# Patient Record
Sex: MALE | Race: WHITE | NOT HISPANIC OR LATINO | Employment: FULL TIME | URBAN - NONMETROPOLITAN AREA
[De-identification: names, ages, dates, MRNs, and addresses within clinical notes are randomized per-mention and may not be internally consistent; named-entity substitution may affect disease eponyms.]

---

## 2019-01-18 ENCOUNTER — APPOINTMENT (OUTPATIENT)
Dept: OCCUPATIONAL MEDICINE | Facility: OTHER | Age: 29
End: 2019-01-18

## 2019-01-18 PROCEDURE — 82075 ASSAY OF BREATH ETHANOL: CPT

## 2019-01-18 PROCEDURE — 99000 SPECIMEN HANDLING OFFICE-LAB: CPT

## 2019-01-18 PROCEDURE — 99199 UNLISTED SPECIAL SVC PX/RPRT: CPT

## 2019-06-12 ENCOUNTER — APPOINTMENT (OUTPATIENT)
Dept: OCCUPATIONAL MEDICINE | Facility: OTHER | Age: 29
End: 2019-06-12

## 2019-06-12 ENCOUNTER — APPOINTMENT (OUTPATIENT)
Dept: CHIROPRACTIC MEDICINE | Facility: OTHER | Age: 29
End: 2019-06-12

## 2019-06-12 PROCEDURE — 97799 UNLISTED PHYSCL MED/REHAB PX: CPT

## 2019-06-12 PROCEDURE — 99000 SPECIMEN HANDLING OFFICE-LAB: CPT

## 2019-06-12 PROCEDURE — 99499 UNLISTED E&M SERVICE: CPT

## 2020-11-16 ENCOUNTER — APPOINTMENT (OUTPATIENT)
Dept: OCCUPATIONAL MEDICINE | Facility: OTHER | Age: 30
End: 2020-11-16

## 2020-11-16 PROCEDURE — 99000 SPECIMEN HANDLING OFFICE-LAB: CPT

## 2022-09-14 ENCOUNTER — OFFICE VISIT (OUTPATIENT)
Dept: FAMILY MEDICINE | Facility: OTHER | Age: 32
End: 2022-09-14
Attending: NURSE PRACTITIONER
Payer: COMMERCIAL

## 2022-09-14 VITALS
OXYGEN SATURATION: 99 % | HEART RATE: 98 BPM | RESPIRATION RATE: 16 BRPM | SYSTOLIC BLOOD PRESSURE: 132 MMHG | WEIGHT: 186.4 LBS | TEMPERATURE: 99.2 F | DIASTOLIC BLOOD PRESSURE: 84 MMHG

## 2022-09-14 DIAGNOSIS — K60.2 RECTAL FISSURE: Primary | ICD-10-CM

## 2022-09-14 PROCEDURE — 99213 OFFICE O/P EST LOW 20 MIN: CPT | Performed by: FAMILY MEDICINE

## 2022-09-14 RX ORDER — MUPIROCIN 20 MG/G
OINTMENT TOPICAL 2 TIMES DAILY
Qty: 30 G | Refills: 1 | Status: SHIPPED | OUTPATIENT
Start: 2022-09-14 | End: 2023-09-21

## 2022-09-14 ASSESSMENT — PAIN SCALES - GENERAL: PAINLEVEL: NO PAIN (0)

## 2022-09-14 NOTE — NURSING NOTE
Chief Complaint   Patient presents with     Rectal Problem     Hemorhoids, fissure - for about a month       Initial /84 (BP Location: Right arm, Patient Position: Sitting, Cuff Size: Adult Regular)   Pulse 98   Temp 99.2  F (37.3  C) (Temporal)   Resp 16   Wt 84.6 kg (186 lb 6.4 oz)   SpO2 99%  There is no height or weight on file to calculate BMI.  Medication Reconciliation: complete      FOOD SECURITY SCREENING QUESTIONS:    The next two questions are to help us understand your food security.  If you are feeling you need any assistance in this area, we have resources available to support you today.    Hunger Vital Signs:  Within the past 12 months we worried whether our food would run out before we got money to buy more. Never  Within the past 12 months the food we bought just didn't last and we didn't have money to get more. Never        Advance care plan reviewed      Marilou Gaines LPN on 9/14/2022 at 5:39 PM

## 2022-09-14 NOTE — PATIENT INSTRUCTIONS
Nadeem Ruby was seen in [facility] 9/14/2022.  Kimi Yadav MD       Consider a donut to sit on

## 2022-09-14 NOTE — PROGRESS NOTES
SUBJECTIVE:   Nursing Notes:   Marilou Gaines LPN  9/14/2022  5:39 PM  Sign at exiting of workspace  Chief Complaint   Patient presents with     Rectal Problem     Hemorhoids, fissure - for about a month       Initial /84 (BP Location: Right arm, Patient Position: Sitting, Cuff Size: Adult Regular)   Pulse 98   Temp 99.2  F (37.3  C) (Temporal)   Resp 16   Wt 84.6 kg (186 lb 6.4 oz)   SpO2 99%  There is no height or weight on file to calculate BMI.  Medication Reconciliation: complete      FOOD SECURITY SCREENING QUESTIONS:    The next two questions are to help us understand your food security.  If you are feeling you need any assistance in this area, we have resources available to support you today.    Hunger Vital Signs:  Within the past 12 months we worried whether our food would run out before we got money to buy more. Never  Within the past 12 months the food we bought just didn't last and we didn't have money to get more. Never        Advance care plan reviewed      Marilou Gaines LPN on 9/14/2022 at 5:39 PM           HPI    Nadeem Ruby is a 32 year old male who presents to clinic today for the following health issues: hemorrhoids   He works as an  and spends all shift sitting in his machine.  He started noticing a month ago a lump with sitting.   Used some hemorrhoid and fissure cream. (lidocaine  This helped, but then symptoms came back.  He's had some bleeding - especially with using the toilet paper at work - bright red on the toilet paper.   BMs usually are not hard.      Current Outpatient Medications   Medication     mupirocin (BACTROBAN) 2 % external ointment     No current facility-administered medications for this visit.         Allergies   Allergen Reactions     Amoxicillin      Ceclor [Cefaclor]      Pcn [Penicillins]      History reviewed. No pertinent past medical history.   History reviewed. No pertinent surgical history.          Review of Systems      OBJECTIVE:     /84 (BP Location: Right arm, Patient Position: Sitting, Cuff Size: Adult Regular)   Pulse 98   Temp 99.2  F (37.3  C) (Temporal)   Resp 16   Wt 84.6 kg (186 lb 6.4 oz)   SpO2 99%   There is no height or weight on file to calculate BMI.  Physical Exam  Vitals and nursing note reviewed.   Constitutional:       Appearance: Normal appearance.   Genitourinary:     Comments: At 10:00, about half a centimeter from the anal opening is 3 mm fissure/abrasion  Neurological:      Mental Status: He is alert.               ASSESSMENT/PLAN:       ICD-10-CM    1. Rectal fissure  K60.2 mupirocin (BACTROBAN) 2 % external ointment      1.  This fissure is in the little bit of an unusual spot, but I do not see any evidence of this time of fistula or sinus tract.  This may be more an abrasion from sitting/work than direct bowel movement related.  We will treat this with pressure relieving devices, discussed different cushions/donuts from when sitting at work.  We will also initially treat this with Bactroban ointment twice a day.  If symptoms persist, consider having him see general surgery.  EFRAIN CUTLER MD  [facility] AND \[facility] [place] [facility]\""

## 2023-07-04 ENCOUNTER — OFFICE VISIT (OUTPATIENT)
Dept: FAMILY MEDICINE | Facility: OTHER | Age: 33
End: 2023-07-04
Attending: NURSE PRACTITIONER
Payer: COMMERCIAL

## 2023-07-04 VITALS
TEMPERATURE: 98.1 F | HEIGHT: 63 IN | DIASTOLIC BLOOD PRESSURE: 101 MMHG | SYSTOLIC BLOOD PRESSURE: 162 MMHG | OXYGEN SATURATION: 97 % | WEIGHT: 187.5 LBS | BODY MASS INDEX: 33.22 KG/M2 | HEART RATE: 99 BPM | RESPIRATION RATE: 20 BRPM

## 2023-07-04 DIAGNOSIS — K60.1 CHRONIC ANAL FISSURE: Primary | ICD-10-CM

## 2023-07-04 DIAGNOSIS — H61.22 IMPACTED CERUMEN OF LEFT EAR: ICD-10-CM

## 2023-07-04 PROCEDURE — 69209 REMOVE IMPACTED EAR WAX UNI: CPT | Mod: LT

## 2023-07-04 PROCEDURE — 99214 OFFICE O/P EST MOD 30 MIN: CPT

## 2023-07-04 RX ORDER — LORATADINE 10 MG/1
10 TABLET ORAL PRN
COMMUNITY

## 2023-07-04 RX ORDER — MUPIROCIN 20 MG/G
OINTMENT TOPICAL 3 TIMES DAILY
Qty: 30 G | Refills: 0 | Status: SHIPPED | OUTPATIENT
Start: 2023-07-04 | End: 2023-09-21

## 2023-07-04 ASSESSMENT — PAIN SCALES - GENERAL: PAINLEVEL: NO PAIN (0)

## 2023-07-04 NOTE — NURSING NOTE
"Pt presents to  for anal fissures. Pt has had problems with this in the past. Pt noticed pain and some bleeding 1 wk ago. Was treated last time with an ointment, but is out of it.    Chief Complaint   Patient presents with     Rectal Problem     Fissure       FOOD SECURITY SCREENING QUESTIONS  Hunger Vital Signs:  Within the past 12 months we worried whether our food would run out before we got money to buy more. Never  Within the past 12 months the food we bought just didn't last and we didn't have money to get more. Never  Velvet Dearmon 7/4/2023 1:00 PM      Initial BP (!) 172/108 (BP Location: Right arm, Patient Position: Sitting, Cuff Size: Adult Regular)   Pulse 99   Temp 98.1  F (36.7  C) (Tympanic)   Resp 20   Ht 1.6 m (5' 3\")   Wt 85 kg (187 lb 8 oz)   SpO2 97%   BMI 33.21 kg/m   Estimated body mass index is 33.21 kg/m  as calculated from the following:    Height as of this encounter: 1.6 m (5' 3\").    Weight as of this encounter: 85 kg (187 lb 8 oz).  Medication Reconciliation: complete    Velvet Dearmon    "

## 2023-07-04 NOTE — PROGRESS NOTES
ASSESSMENT/PLAN:    I have reviewed the nursing notes.  I have reviewed the findings, diagnosis, plan and need for follow up with the patient.    1. Chronic anal fissure  - mupirocin (BACTROBAN) 2 % external ointment; Apply topically 3 times daily  Dispense: 30 g; Refill: 0  - Adult General Surg Referral; Future    Physical exam and symptoms consistent with chronic anal fissure as patient has had anal fissure in the same place when he was evaluated 9 months ago.  Will treat with Bactroban.  Also placed referral to general surgery to discuss if surgical intervention is necessary.  Advised patient to avoid sitting for long periods of time and to try stool softener if his stools are getting to be too firm to avoid straining during a bowel movement.    2. Impacted cerumen of left ear  - REMOVE IMPACTED CERUMEN    Physical exam and symptoms also consistent with impacted cerumen of left ear.  Nursing staff is successful at removing cerumen impaction via ear lavage.  Examined patient's ear after ear lavage and reassured that there are no signs of infection at this time.  Discussed ways to treat cerumen buildup at home in the future.    Discussed warning signs/symptoms indicative of need to f/u    Follow up if symptoms persist or worsen or concerns    I explained my diagnostic considerations and recommendations to the patient, who voiced understanding and agreement with the treatment plan. All questions were answered. We discussed potential side effects of any prescribed or recommended therapies, as well as expectations for response to treatments.    Mansoor Braswell, CRISSY CNP  7/4/2023  1:07 PM    HPI:    Nadeem Ruby is a 33 year old male  who presents to [facility] today for concerns of rectal problem    Symptoms started about a week ago but patient believes he has had this fissure for almost a year. Occasional issues with constipation and straining during BM. He has noticed some bright red blood when wiping  "after a BM. Used OTC hemorrhoid/fissure cream with minimal to no relief.  Patient states that he was seen for this in September 2022 and was diagnosed Bactroban which helped relieve symptoms.  He states that he was told if the fissure returns that he should consider a referral to general surgery.  Patient denies any rectal pain, dark stools, abdominal pain, or diarrhea.  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  He did recently switch positions at his job and is no longer working as a  since about 10 days ago.    Patient also would like his left ear examined as he feels there is a cerumen impaction. He states he wears ear plugs frequently for work. He denies any pain or fever.     History reviewed. No pertinent past medical history.  History reviewed. No pertinent surgical history.  Social History     Tobacco Use     Smoking status: Never     Smokeless tobacco: Never   Substance Use Topics     Alcohol use: No     Current Outpatient Medications   Medication Sig Dispense Refill     loratadine (CLARITIN) 10 MG tablet Take 10 mg by mouth as needed for allergies       mupirocin (BACTROBAN) 2 % external ointment Apply topically 2 times daily (Patient not taking: Reported on 7/4/2023) 30 g 1     Allergies   Allergen Reactions     Amoxicillin      Ceclor [Cefaclor]      Pcn [Penicillins]      Past medical history, past surgical history, current medications and allergies reviewed and accurate to the best of my knowledge.      ROS:  Refer to HPI    BP (!) 172/108 (BP Location: Right arm, Patient Position: Sitting, Cuff Size: Adult Regular)   Pulse 99   Temp 98.1  F (36.7  C) (Tympanic)   Resp 20   Ht 1.6 m (5' 3\")   Wt 85 kg (187 lb 8 oz)   SpO2 97%   BMI 33.21 kg/m      EXAM:  General Appearance: Well appearing 33 year old male, appropriate appearance for age. No acute distress   Ears: Left TM intact, translucent with bony landmarks appreciated, no " erythema, no effusion, no bulging, no purulence.  Right TM intact, translucent with bony landmarks appreciated, no erythema, no effusion, no bulging, no purulence.  Left auditory canal with cerumen impaction.  Right auditory canal clear.  Normal external ears, non tender.  Respiratory: normal chest wall and respirations.  Normal effort.  Clear to auscultation bilaterally, no wheezing, crackles or rhonchi.  No increased work of breathing.  No cough appreciated.  Cardiac: RRR with no murmurs  :  0.5 cm fissure about 0.5 cm from anal opening at 10:00 position  Neuro: Alert and oriented to person, place, and time.    Psychological: normal affect, alert, oriented, and pleasant.

## 2023-08-14 ENCOUNTER — OFFICE VISIT (OUTPATIENT)
Dept: SURGERY | Facility: OTHER | Age: 33
End: 2023-08-14
Attending: SURGERY
Payer: COMMERCIAL

## 2023-08-14 VITALS
HEART RATE: 100 BPM | OXYGEN SATURATION: 98 % | HEIGHT: 63 IN | RESPIRATION RATE: 16 BRPM | SYSTOLIC BLOOD PRESSURE: 139 MMHG | WEIGHT: 188.2 LBS | DIASTOLIC BLOOD PRESSURE: 69 MMHG | BODY MASS INDEX: 33.35 KG/M2 | TEMPERATURE: 98.4 F

## 2023-08-14 DIAGNOSIS — K60.1 CHRONIC ANAL FISSURE: ICD-10-CM

## 2023-08-14 PROCEDURE — 99204 OFFICE O/P NEW MOD 45 MIN: CPT | Performed by: SURGERY

## 2023-08-14 ASSESSMENT — PAIN SCALES - GENERAL: PAINLEVEL: NO PAIN (0)

## 2023-08-14 NOTE — NURSING NOTE
"Chief Complaint   Patient presents with    Rectal Problem     Chronic Anal Fissue       Initial /69 (BP Location: Right arm, Patient Position: Sitting, Cuff Size: Adult Large)   Pulse 100   Temp 98.4  F (36.9  C) (Tympanic)   Resp 16   Ht 1.6 m (5' 3\")   Wt 85.4 kg (188 lb 3.2 oz)   SpO2 98%   BMI 33.34 kg/m   Estimated body mass index is 33.34 kg/m  as calculated from the following:    Height as of this encounter: 1.6 m (5' 3\").    Weight as of this encounter: 85.4 kg (188 lb 3.2 oz).  Meds Reconciled: complete      Angi Augustine RN      "

## 2023-08-14 NOTE — PROGRESS NOTES
GENERAL SURGERY CONSULTATION NOTE    Nadeem Ruby   [address] [place] [address]  33 year old  male    Primary Care Provider:  Finesse Lewis      HPI: Nadeem Ruby presents to clinic with history of intermittent rectal bleeding.  Patient states he was diagnosed with a fissure sometime ago.  Has been having intermittent bleeding for approximately 1 year.  Patient states that he will have a bowel movement and see some red blood, occasionally there is mild discomfort but this resolves pretty quickly on his own.  Patient denies history of constipation or diarrhea.  Patient denies change in diet approximately 1 year ago patient denies even intermittent hard or difficult to pass bowel movements.  Patient denies feeling extra tissue down there, such as a hemorrhoid.    REVIEW OF SYSTEMS:    GENERAL: No fevers or chills. Denies fatigue, recent weight loss.  HEENT: No sinus drainage. No changes with vision or hearing. No difficulty swallowing.   LYMPHATICS:  Noswollen nodes in axilla, neck or groin.  CARDIOVASCULAR: Denies chest pain, palpitations and dyspnea on exertion.  PULMONARY: No shortness of breath or cough. No increase in sputum production.  GI: Denies melena,bright red blood in stools. No hematemesis. No constipation or diarrhea.  : No dysuria or hematuria.  SKIN: No recent rashes or ulcers.   HEMATOLOGY:  No history of easy bruising or bleeding.  ENDOCRINE:  No history of diabetes or thyroid problems.  NEUROLOGY:  No history of seizures or headaches. No motor or sensory changes.        There is no problem list on file for this patient.      No past medical history on file.    No past surgical history on file.    No family history on file.    Social History     Social History Narrative    Not on file       Social History     Socioeconomic History    Marital status: Single     Spouse name: Not on file    Number of children: Not on file    Years of education: Not on file    Highest education  "level: Not on file   Occupational History    Not on file   Tobacco Use    Smoking status: Never    Smokeless tobacco: Never   Vaping Use    Vaping Use: Never used   Substance and Sexual Activity    Alcohol use: No    Drug use: No    Sexual activity: Never   Other Topics Concern    Not on file   Social History Narrative    Not on file     Social Determinants of Health     Financial Resource Strain: Not on file   Food Insecurity: Not on file   Transportation Needs: Not on file   Physical Activity: Not on file   Stress: Not on file   Social Connections: Not on file   Intimate Partner Violence: Not on file   Housing Stability: Not on file       loratadine (CLARITIN) 10 MG tablet, Take 10 mg by mouth as needed for allergies  mupirocin (BACTROBAN) 2 % external ointment, Apply topically 3 times daily (Patient not taking: Reported on 8/14/2023)  mupirocin (BACTROBAN) 2 % external ointment, Apply topically 2 times daily (Patient not taking: Reported on 8/14/2023)    No current facility-administered medications on file prior to visit.        ALLERGIES/SENSITIVITIES:   Allergies   Allergen Reactions    Amoxicillin     Ceclor [Cefaclor]     Pcn [Penicillins]        PHYSICAL EXAM:     /69 (BP Location: Right arm, Patient Position: Sitting, Cuff Size: Adult Large)   Pulse 100   Temp 98.4  F (36.9  C) (Tympanic)   Resp 16   Ht 1.6 m (5' 3\")   Wt 85.4 kg (188 lb 3.2 oz)   SpO2 98%   BMI 33.34 kg/m      General Appearance:   Sitting up in the chair, no apparent distress  HEENT: Pupils are equal and reactive, no scleral icterus,   Heart & CV:  RRR no murmur.  Intact distal pulses, good cap refill.  LUNGS: No increased work of breathing.Lugns are CTA B/L, no wheezing or crackles.  Abd: Soft, nontender, nondistended.  Anal exam shows a small, healed or healing anal fissure without sentinel tag.  No evidence of external hemorrhoid.  Digital exam and anoscopy is deferred today due to presence of fissure.  Ext: Normal bulk " and tone, no lower extremity edema  Neuro: Alert and oriented, normal speech and mentation        CONSULTATION ASSESSMENT AND PLAN:    33 year old male with anal fissure.  The patient's fissure is not currently inflamed or symptomatic.  I did recommend the patient starting fiber supplement to avoid any future incidents.  Also, discussed management of acute discomfort with sitz bath's and discussed further treatment options if he continues to have intermittent symptoms or symptoms worsen.  Discussed smooth muscle relaxant ointments in addition to surgical sphincterotomy.  Patient demonstrated understanding and will follow-up in clinic if his symptoms persist or get worse despite fiber therapy.    Loyd Mak MD on 8/14/2023 at 12:35 PM

## 2023-08-24 ENCOUNTER — OFFICE VISIT (OUTPATIENT)
Dept: FAMILY MEDICINE | Facility: OTHER | Age: 33
End: 2023-08-24
Payer: COMMERCIAL

## 2023-08-24 VITALS
HEART RATE: 109 BPM | HEIGHT: 63 IN | TEMPERATURE: 99.1 F | WEIGHT: 187.7 LBS | SYSTOLIC BLOOD PRESSURE: 142 MMHG | RESPIRATION RATE: 20 BRPM | DIASTOLIC BLOOD PRESSURE: 80 MMHG | BODY MASS INDEX: 33.26 KG/M2 | OXYGEN SATURATION: 99 %

## 2023-08-24 DIAGNOSIS — R03.0 ELEVATED BLOOD PRESSURE READING WITHOUT DIAGNOSIS OF HYPERTENSION: Primary | ICD-10-CM

## 2023-08-24 PROCEDURE — 99213 OFFICE O/P EST LOW 20 MIN: CPT

## 2023-08-24 ASSESSMENT — PAIN SCALES - GENERAL: PAINLEVEL: NO PAIN (0)

## 2023-08-24 NOTE — LETTER
[facility]  [address]  [place] [address]  Phone: [phone]  Fax: [phone]    August 24, 2023        Nadeem Ruby  [address] [place] [address]          To whom it may concern:    RE: Nadeem Ruby    Patient was seen and treated today at our clinic and missed work. He may return to work at this time, blood pressure was mildly elevated on exam and we are opting to treat with lifestyle modification at this time and close follow up in primary care.     Please contact me for questions or concerns.      Sincerely,        CRISSY VICTOR CNP

## 2023-08-24 NOTE — PROGRESS NOTES
ASSESSMENT/PLAN:    (R03.0) Elevated blood pressure reading without diagnosis of hypertension  (primary encounter diagnosis)  Comment: Patient presents today for concerns of elevated blood pressure that was noted today at a work physical.  He has no formal diagnosis of hypertension.  He notes that his work sent him here to be evaluated.  He reports prior to his exam he drink a Monster energy drink.  On exam blood pressure is mildly elevated at 142/80, pulse is also elevated at 109.  Eye exam was normal.  Blood pressure is mildly elevated and has been in the past, however with his elevated pulse and history of drinking Monster energy drink I think this may have contributed to changes in vital signs.  I discussed with patient at length treatment with medication versus lifestyle factors.  At this point we proceeded with lifestyle factors including moderate exercise of 30 minutes 5 times a week and heart healthy diet, information on DASH diet was provided.  He plans to follow-up in 4 to 6 weeks with primary care to establish care and follow-up with blood pressure evaluation and success of lifestyle treatment.      Discussed warning signs/symptoms indicative of need to f/u    Follow up if symptoms persist or worsen or concerns    I have reviewed the nursing notes.  I have reviewed the findings, diagnosis, plan and need for follow up with the patient.    I explained my diagnostic considerations and recommendations to the patient, who voiced understanding and agreement with the treatment plan. All questions were answered. We discussed potential side effects of any prescribed or recommended therapies, as well as expectations for response to treatments.    CRISSY VICTOR CNP  8/24/2023  1:20 PM    HPI:    Nadeem Ruby is a 33 year old male  who presents to [facility] today for concerns of increased blood pressure.    Patient reports that he was at a work physical today and his blood pressure was elevated.   "His work sent him here to be evaluated and possibly treated for high blood pressure.  No formal diagnosis of hypertension, he currently does not have a primary care provider.  He denies any symptoms including headache, vision changes, dizziness, chest pain, shortness of breath, or any other concerning symptoms.  Patient does endorse drinking Monster energy drink prior to his evaluation today.    Allergies to Ceclor and penicillins.    No past medical history on file.  No past surgical history on file.  Social History     Tobacco Use    Smoking status: Never    Smokeless tobacco: Never   Substance Use Topics    Alcohol use: No     Current Outpatient Medications   Medication Sig Dispense Refill    loratadine (CLARITIN) 10 MG tablet Take 10 mg by mouth as needed for allergies      mupirocin (BACTROBAN) 2 % external ointment Apply topically 3 times daily (Patient not taking: Reported on 8/14/2023) 30 g 0    mupirocin (BACTROBAN) 2 % external ointment Apply topically 2 times daily (Patient not taking: Reported on 8/14/2023) 30 g 1     Allergies   Allergen Reactions    Amoxicillin     Ceclor [Cefaclor]     Pcn [Penicillins]      Past medical history, past surgical history, current medications and allergies reviewed and accurate to the best of my knowledge.      ROS:  Refer to HPI    BP (!) 142/80 (BP Location: Right arm)   Pulse 109   Temp 99.1  F (37.3  C) (Tympanic)   Resp 20   Ht 1.6 m (5' 3\")   Wt 85.1 kg (187 lb 11.2 oz)   SpO2 99%   BMI 33.25 kg/m      EXAM:  General Appearance: Well appearing 33 year old male, appropriate appearance for age. No acute distress   Ears: Left TM intact, translucent with bony landmarks appreciated, no erythema, no effusion, no bulging, no purulence.  Right TM intact, translucent with bony landmarks appreciated, no erythema, no effusion, no bulging, no purulence.  Left auditory canal clear.  Right auditory canal clear.  Normal external ears, non tender.  Eyes: conjunctivae normal " without erythema or irritation, corneas clear, no drainage or crusting, no eyelid swelling, pupils equal   Oropharynx: moist mucous membranes, posterior pharynx without erythema, no exudates or petechiae, no post nasal drip seen, no trismus, voice clear.    Sinuses:  No sinus tenderness upon palpation of the frontal or maxillary sinuses  Nose:  Bilateral nares: no erythema, no edema, no drainage or congestion   Neck: supple without adenopathy  Respiratory: normal chest wall and respirations.  Normal effort.  Clear to auscultation bilaterally, no wheezing, crackles or rhonchi.  No increased work of breathing.  No cough appreciated.  Cardiac: RRR with no murmurs  Musculoskeletal:  Equal movement of bilateral upper extremities.  Equal movement of bilateral lower extremities.  Normal gait.    Dermatological: no rashes noted of exposed skin  Neuro: Alert and oriented to person, place, and time.  Cranial nerves II-XII grossly intact with no focal or lateralizing deficits.  Muscle tone normal.  Gait normal. No tremor.   Psychological: normal affect, alert, oriented, and pleasant.

## 2023-08-24 NOTE — NURSING NOTE
"Pt presents to  for high blood pressure. Pt states he had physical done at work and he had a high reading - work told him to come get his BP checked.    Chief Complaint   Patient presents with    Recheck Medication    Blood Pressure Check       FOOD SECURITY SCREENING QUESTIONS  Hunger Vital Signs:  Within the past 12 months we worried whether our food would run out before we got money to buy more. Never  Within the past 12 months the food we bought just didn't last and we didn't have money to get more. Never  Velvet Taylor 8/24/2023 1:12 PM      Initial BP (!) 152/84 (BP Location: Right arm, Patient Position: Sitting, Cuff Size: Adult Large)   Pulse 109   Temp 99.1  F (37.3  C) (Tympanic)   Resp 20   Ht 1.6 m (5' 3\")   Wt 85.1 kg (187 lb 11.2 oz)   SpO2 99%   BMI 33.25 kg/m   Estimated body mass index is 33.25 kg/m  as calculated from the following:    Height as of this encounter: 1.6 m (5' 3\").    Weight as of this encounter: 85.1 kg (187 lb 11.2 oz).  Medication Reconciliation: complete    Velvet Taylor  "

## 2023-09-21 ENCOUNTER — OFFICE VISIT (OUTPATIENT)
Dept: FAMILY MEDICINE | Facility: OTHER | Age: 33
End: 2023-09-21
Attending: NURSE PRACTITIONER
Payer: COMMERCIAL

## 2023-09-21 VITALS
HEART RATE: 88 BPM | DIASTOLIC BLOOD PRESSURE: 98 MMHG | HEIGHT: 63 IN | OXYGEN SATURATION: 97 % | RESPIRATION RATE: 20 BRPM | SYSTOLIC BLOOD PRESSURE: 154 MMHG | TEMPERATURE: 98.3 F | WEIGHT: 185.2 LBS | BODY MASS INDEX: 32.82 KG/M2

## 2023-09-21 DIAGNOSIS — Z13.220 LIPID SCREENING: ICD-10-CM

## 2023-09-21 DIAGNOSIS — I10 HYPERTENSION, UNSPECIFIED TYPE: Primary | ICD-10-CM

## 2023-09-21 LAB
ALBUMIN SERPL BCG-MCNC: 4.6 G/DL (ref 3.5–5.2)
ALP SERPL-CCNC: 84 U/L (ref 40–129)
ALT SERPL W P-5'-P-CCNC: 54 U/L (ref 0–70)
ANION GAP SERPL CALCULATED.3IONS-SCNC: 10 MMOL/L (ref 7–15)
AST SERPL W P-5'-P-CCNC: 24 U/L (ref 0–45)
BILIRUB SERPL-MCNC: 1.4 MG/DL
BUN SERPL-MCNC: 14 MG/DL (ref 6–20)
CALCIUM SERPL-MCNC: 9.8 MG/DL (ref 8.6–10)
CHLORIDE SERPL-SCNC: 101 MMOL/L (ref 98–107)
CHOLEST SERPL-MCNC: 153 MG/DL
CREAT SERPL-MCNC: 1.09 MG/DL (ref 0.67–1.17)
DEPRECATED HCO3 PLAS-SCNC: 26 MMOL/L (ref 22–29)
EGFRCR SERPLBLD CKD-EPI 2021: >90 ML/MIN/1.73M2
GLUCOSE SERPL-MCNC: 104 MG/DL (ref 70–99)
HDLC SERPL-MCNC: 40 MG/DL
HOLD SPECIMEN: NORMAL
LDLC SERPL CALC-MCNC: 96 MG/DL
NONHDLC SERPL-MCNC: 113 MG/DL
POTASSIUM SERPL-SCNC: 4 MMOL/L (ref 3.4–5.3)
PROT SERPL-MCNC: 6.8 G/DL (ref 6.4–8.3)
SODIUM SERPL-SCNC: 137 MMOL/L (ref 136–145)
TRIGL SERPL-MCNC: 86 MG/DL

## 2023-09-21 PROCEDURE — 80061 LIPID PANEL: CPT | Mod: ZL | Performed by: NURSE PRACTITIONER

## 2023-09-21 PROCEDURE — 80053 COMPREHEN METABOLIC PANEL: CPT | Mod: ZL | Performed by: NURSE PRACTITIONER

## 2023-09-21 PROCEDURE — 36415 COLL VENOUS BLD VENIPUNCTURE: CPT | Mod: ZL | Performed by: NURSE PRACTITIONER

## 2023-09-21 PROCEDURE — 99213 OFFICE O/P EST LOW 20 MIN: CPT | Performed by: NURSE PRACTITIONER

## 2023-09-21 RX ORDER — LISINOPRIL 20 MG/1
20 TABLET ORAL DAILY
Qty: 90 TABLET | Refills: 0 | Status: SHIPPED | OUTPATIENT
Start: 2023-09-21 | End: 2023-10-05

## 2023-09-21 ASSESSMENT — PAIN SCALES - GENERAL: PAINLEVEL: NO PAIN (0)

## 2023-09-21 NOTE — NURSING NOTE
Patient presents today for [facility] follow up on blood pressure.    Medication Reconciliation Complete    Meagan Aceves LPN  9/21/2023 10:10 AM

## 2023-09-21 NOTE — PROGRESS NOTES
"  Assessment & Plan   Problem List Items Addressed This Visit    None  Visit Diagnoses       Hypertension, unspecified type    -  Primary    Relevant Medications    lisinopril (ZESTRIL) 20 MG tablet    Other Relevant Orders    Comprehensive Metabolic Panel    Lipid screening        Relevant Orders    Lipid Panel        In review of medical records, he has had 3 blood pressure readings in the clinic for the past year that have been elevated in the 150s to 170s over 80s to 100s.  We will start him on lisinopril 20 mg daily.  CMP and lipid panel updated today.  He will monitor blood pressures daily at home for the next couple weeks, follow in clinic in the next 2 to 3 weeks for recheck of blood pressure, sooner if he is having any concerns.  We discussed lifestyle modifications, handout was provided.  Blood pressure goal is 120/70 or less.    Ordering of each unique test  Prescription drug management         BMI:   Estimated body mass index is 32.81 kg/m  as calculated from the following:    Height as of this encounter: 1.6 m (5' 3\").    Weight as of this encounter: 84 kg (185 lb 3.2 oz).           No follow-ups on file.    [facility]    Subjective   Autumn is a 33 year old, presenting for the following health issues:  RECHECK (Blood pressure)      History of Present Illness       Reason for visit:  High blood pressure check-up.    He eats 2-3 servings of fruits and vegetables daily.He consumes 1 sweetened beverage(s) daily.He exercises with enough effort to increase his heart rate 10 to 19 minutes per day.  He exercises with enough effort to increase his heart rate 7 days per week.   He is taking medications regularly.     He comes into clinic today to follow-up on hypertension.  He was seen in the clinic a month ago due to hypertension concerns.  He had a work physical and had elevated blood pressure.  He was sent home and stated he could not come back to work until this " "evaluated.  He was concerned that he went to the [facility] clinic that day.  He is a  through contact.  He has minimal exercise at work.  He does not remember what his blood pressure was at the work physical but he went to the [facility] clinic this did come down to the 140s over 80s.  He reports that since that visit he has been increasing his exercise, using the treadmill 20 minutes a day and working on a healthier diet.  He does not have a lot of salt in his diet, occasional energy drinks.  He denies any tobacco use.  Unsure of any family history of hypertension but does report his father had a heart attack in the past.  He has not had any chest pains or shortness of breath, no changes in vision, no headaches.  He is not checking blood pressures at home.    Review of Systems   See above      Objective    BP (!) 150/106   Pulse 88   Temp 98.3  F (36.8  C)   Resp 20   Ht 1.6 m (5' 3\")   Wt 84 kg (185 lb 3.2 oz)   SpO2 97%   BMI 32.81 kg/m    Body mass index is 32.81 kg/m .  Physical Exam   GENERAL: healthy, alert and no distress  EYES: Eyes grossly normal to inspection  RESP: lungs clear to auscultation - no rales, rhonchi or wheezes  CV: regular rate and rhythm, normal S1 S2, no S3 or S4, no murmur, click or rub, no peripheral edema and peripheral pulses strong  NEURO: Normal strength and tone, mentation intact and speech normal  PSYCH: mentation appears normal, affect normal/bright                      "

## 2023-10-05 ENCOUNTER — OFFICE VISIT (OUTPATIENT)
Dept: FAMILY MEDICINE | Facility: OTHER | Age: 33
End: 2023-10-05
Attending: NURSE PRACTITIONER
Payer: COMMERCIAL

## 2023-10-05 VITALS
DIASTOLIC BLOOD PRESSURE: 82 MMHG | TEMPERATURE: 97.9 F | OXYGEN SATURATION: 96 % | HEART RATE: 80 BPM | HEIGHT: 63 IN | BODY MASS INDEX: 31.82 KG/M2 | RESPIRATION RATE: 16 BRPM | WEIGHT: 179.6 LBS | SYSTOLIC BLOOD PRESSURE: 130 MMHG

## 2023-10-05 DIAGNOSIS — I10 HYPERTENSION, UNSPECIFIED TYPE: ICD-10-CM

## 2023-10-05 PROCEDURE — 99213 OFFICE O/P EST LOW 20 MIN: CPT | Performed by: NURSE PRACTITIONER

## 2023-10-05 RX ORDER — LISINOPRIL 20 MG/1
20 TABLET ORAL DAILY
Qty: 90 TABLET | Refills: 3 | Status: SHIPPED | OUTPATIENT
Start: 2023-10-05 | End: 2024-03-13

## 2023-10-05 ASSESSMENT — PAIN SCALES - GENERAL: PAINLEVEL: NO PAIN (0)

## 2023-10-05 NOTE — NURSING NOTE
Patient presents today for follow up on blood pressure.    Medication Reconciliation Complete    Meagan Aceves LPN  10/5/2023 8:53 AM

## 2023-10-05 NOTE — PROGRESS NOTES
"  Assessment & Plan   Problem List Items Addressed This Visit    None  Visit Diagnoses       Hypertension, unspecified type        Relevant Medications    lisinopril (ZESTRIL) 20 MG tablet        Tolerating lisinopril 20 mg well without side effects.  Blood pressure is currently at goal.  We did review his previous lab work, all stable.  Recommend spot checking blood pressures at home, if these start going greater than goal consistently or are running low, he will follow-up in clinic.  Otherwise, he can follow-up in 1 year, sooner if needed.  Declines updating immunizations at this time.    Review of the result(s) of each unique test - lipid, CMP  Prescription drug management         BMI:   Estimated body mass index is 31.81 kg/m  as calculated from the following:    Height as of this encounter: 1.6 m (5' 3\").    Weight as of this encounter: 81.5 kg (179 lb 9.6 oz).   Weight management plan: Discussed healthy diet and exercise guidelines        No follow-ups on file.    [facility]    Subjective   Pat is a 33 year old, presenting for the following health issues:  RECHECK      History of Present Illness       Hypertension: He presents for follow up of hypertension.  He does check blood pressure  regularly outside of the clinic. Outpatient blood pressures have not been over 140/90. He follows a low salt diet.     He eats 2-3 servings of fruits and vegetables daily.He consumes 0 sweetened beverage(s) daily.He exercises with enough effort to increase his heart rate 10 to 19 minutes per day.  He exercises with enough effort to increase his heart rate 7 days per week.   He is taking medications regularly.     He comes in today for follow-up on hypertension.  Seen a couple weeks ago, blood pressure was significantly elevated.  Started on lisinopril 20 mg daily.  CMP and lipid panel were completed, these were stable.  He has been tolerating blood pressure medications well, no " "cough or side effects.  Home blood pressure readings have been 120s over 70s, yesterday was 122/75.    Review of Systems   See above      Objective    /82   Pulse 80   Temp 97.9  F (36.6  C)   Resp 16   Ht 1.6 m (5' 3\")   Wt 81.5 kg (179 lb 9.6 oz)   SpO2 96%   BMI 31.81 kg/m    Body mass index is 31.81 kg/m .  Physical Exam   GENERAL: healthy, alert and no distress  EYES: Eyes grossly normal to inspection  RESP: lungs clear to auscultation - no rales, rhonchi or wheezes  CV: regular rate and rhythm, normal S1 S2  PSYCH: mentation appears normal, affect normal/bright                      "

## 2024-01-07 ENCOUNTER — HEALTH MAINTENANCE LETTER (OUTPATIENT)
Age: 34
End: 2024-01-07

## 2024-03-13 DIAGNOSIS — I10 HYPERTENSION, UNSPECIFIED TYPE: ICD-10-CM

## 2024-03-13 NOTE — TELEPHONE ENCOUNTER
Refill request for lisinopril. Patient is requesting refills be sent to [facility]. Order pended. Patient was seen on 10/05/23 and was told to follow up in 1 year.    Meagan Aceves LPN on 3/13/2024 at 3:45 PM     Provider Procedure Text (E): After obtaining clear surgical margins the defect was repaired by another provider.

## 2024-03-14 RX ORDER — LISINOPRIL 20 MG/1
20 TABLET ORAL DAILY
Qty: 90 TABLET | Refills: 3 | Status: SHIPPED | OUTPATIENT
Start: 2024-03-14

## 2025-01-25 ENCOUNTER — HEALTH MAINTENANCE LETTER (OUTPATIENT)
Age: 35
End: 2025-01-25

## 2025-04-06 DIAGNOSIS — I10 HYPERTENSION, UNSPECIFIED TYPE: ICD-10-CM

## 2025-04-08 RX ORDER — LISINOPRIL 20 MG/1
20 TABLET ORAL DAILY
Qty: 90 TABLET | Refills: 0 | Status: SHIPPED | OUTPATIENT
Start: 2025-04-08

## 2025-04-08 NOTE — TELEPHONE ENCOUNTER
[facility] sent Rx request for the following:      Requested Prescriptions   Pending Prescriptions Disp Refills    lisinopril (ZESTRIL) 20 MG tablet [Pharmacy Med Name: LISINOPRIL TABS 20MG] 90 tablet 3     Sig: TAKE 1 TABLET DAILY       ACE Inhibitors (Including Combos) Protocol Failed - 4/8/2025 10:08 AM        Failed - Most recent blood pressure under 140/90 in past 12 months- Clinicial or Patient Reported     BP Readings from Last 3 Encounters:   10/05/23 130/82   09/21/23 (!) 154/98   08/24/23 (!) 142/80   No data recorded        Failed - Recent (12 mo) or future (90 days) visit within the authorizing provider's specialty        Failed - Most recent GFR on file in the past 12 months >30        Failed - Normal serum potassium on file in past 12 months     Recent Labs   Lab Test 09/21/23  1028   POTASSIUM 4.0        Last Prescription Date:   3/14/24  Last Fill Qty/Refills:         90, R-3    Last Office Visit:              10/5/23   Future Office visit:           None    Pt due for annual exam. Routing to provider for refill consideration. Routing to Unit scheduling pool, to assist Pt in scheduling appointment. Unable to complete prescription refill per RN Medication Refill Policy. Velvet Coto RN .............. 4/8/2025  10:21 AM